# Patient Record
Sex: MALE
[De-identification: names, ages, dates, MRNs, and addresses within clinical notes are randomized per-mention and may not be internally consistent; named-entity substitution may affect disease eponyms.]

---

## 2024-02-12 ENCOUNTER — NURSE TRIAGE (OUTPATIENT)
Dept: OTHER | Facility: CLINIC | Age: 64
End: 2024-02-12

## 2024-02-12 NOTE — TELEPHONE ENCOUNTER
Location of patient: [place]    Received call from Glory at [facility]; Patient with Red Flag Complaint requesting to establish care with Hugh .    Subjective: Caller states \"he fell in the shower and hit his head and ribs\"     Current Symptoms: fall with head and chest injury. Bruise to left forehead. Severe rib pain.     Onset: 1 day ago; sudden    Associated Symptoms: reduced activity    Pain Severity: severe pain    Temperature: NA     What has been tried: resting    LMP: NA Pregnant: NA    Recommended disposition: Go to ED Now.Wife reports pt will refuse to go to ED. Provided with the risks of not following disposition and informed them that this is best practice. Wife states she will try to get him to go to ED or at least a [facility]    Care advice provided, patient verbalizes understanding; denies any other questions or concerns; instructed to call back for any new or worsening symptoms.    Patient/caller agrees to proceed to a UCC or Emergency Department if she can get pt to agree    Attention Provider:  Thank you for allowing me to participate in the care of your patient.  The patient was connected to triage in response to information provided to the [facility].  Please do not respond through this encounter as the response is not directed to a shared pool.      Reason for Disposition   SEVERE chest pain   Sounds like a serious injury to the triager    Protocols used: Head Injury-ADULT-[place], Chest Injury-ADULT-[place]